# Patient Record
Sex: MALE | Race: WHITE | Employment: FULL TIME | URBAN - METROPOLITAN AREA
[De-identification: names, ages, dates, MRNs, and addresses within clinical notes are randomized per-mention and may not be internally consistent; named-entity substitution may affect disease eponyms.]

---

## 2019-09-18 ENCOUNTER — HOSPITAL ENCOUNTER (EMERGENCY)
Facility: CLINIC | Age: 59
Discharge: ANOTHER HOSPITAL | End: 2019-09-18
Attending: EMERGENCY MEDICINE | Admitting: EMERGENCY MEDICINE
Payer: COMMERCIAL

## 2019-09-18 ENCOUNTER — APPOINTMENT (OUTPATIENT)
Dept: CT IMAGING | Facility: CLINIC | Age: 59
End: 2019-09-18
Attending: EMERGENCY MEDICINE
Payer: COMMERCIAL

## 2019-09-18 VITALS
DIASTOLIC BLOOD PRESSURE: 78 MMHG | SYSTOLIC BLOOD PRESSURE: 126 MMHG | TEMPERATURE: 98.2 F | HEART RATE: 75 BPM | OXYGEN SATURATION: 97 % | RESPIRATION RATE: 13 BRPM

## 2019-09-18 DIAGNOSIS — K12.2 LUDWIG'S ANGINA: ICD-10-CM

## 2019-09-18 LAB
ANION GAP SERPL CALCULATED.3IONS-SCNC: 6 MMOL/L (ref 3–14)
BASOPHILS # BLD AUTO: 0.1 10E9/L (ref 0–0.2)
BASOPHILS NFR BLD AUTO: 0.4 %
BUN SERPL-MCNC: 24 MG/DL (ref 7–30)
CALCIUM SERPL-MCNC: 9.1 MG/DL (ref 8.5–10.1)
CHLORIDE SERPL-SCNC: 106 MMOL/L (ref 94–109)
CO2 BLDCOV-SCNC: 25 MMOL/L (ref 21–28)
CO2 SERPL-SCNC: 28 MMOL/L (ref 20–32)
CREAT BLD-MCNC: 0.9 MG/DL (ref 0.66–1.25)
CREAT SERPL-MCNC: 0.84 MG/DL (ref 0.66–1.25)
DIFFERENTIAL METHOD BLD: ABNORMAL
EOSINOPHIL # BLD AUTO: 1.1 10E9/L (ref 0–0.7)
EOSINOPHIL NFR BLD AUTO: 4.4 %
ERYTHROCYTE [DISTWIDTH] IN BLOOD BY AUTOMATED COUNT: 12.9 % (ref 10–15)
GFR SERPL CREATININE-BSD FRML MDRD: 87 ML/MIN/{1.73_M2}
GFR SERPL CREATININE-BSD FRML MDRD: >90 ML/MIN/{1.73_M2}
GLUCOSE SERPL-MCNC: 110 MG/DL (ref 70–99)
HCT VFR BLD AUTO: 46.9 % (ref 40–53)
HGB BLD-MCNC: 16.5 G/DL (ref 13.3–17.7)
IMM GRANULOCYTES # BLD: 0.2 10E9/L (ref 0–0.4)
IMM GRANULOCYTES NFR BLD: 0.6 %
LACTATE BLD-SCNC: 1.4 MMOL/L (ref 0.7–2.1)
LYMPHOCYTES # BLD AUTO: 4.7 10E9/L (ref 0.8–5.3)
LYMPHOCYTES NFR BLD AUTO: 19.3 %
MCH RBC QN AUTO: 31.2 PG (ref 26.5–33)
MCHC RBC AUTO-ENTMCNC: 35.2 G/DL (ref 31.5–36.5)
MCV RBC AUTO: 89 FL (ref 78–100)
MONOCYTES # BLD AUTO: 1.9 10E9/L (ref 0–1.3)
MONOCYTES NFR BLD AUTO: 7.9 %
NEUTROPHILS # BLD AUTO: 16.5 10E9/L (ref 1.6–8.3)
NEUTROPHILS NFR BLD AUTO: 67.4 %
NRBC # BLD AUTO: 0 10*3/UL
NRBC BLD AUTO-RTO: 0 /100
PCO2 BLDV: 43 MM HG (ref 40–50)
PH BLDV: 7.38 PH (ref 7.32–7.43)
PLATELET # BLD AUTO: 274 10E9/L (ref 150–450)
PO2 BLDV: 48 MM HG (ref 25–47)
POTASSIUM SERPL-SCNC: 3.3 MMOL/L (ref 3.4–5.3)
RBC # BLD AUTO: 5.29 10E12/L (ref 4.4–5.9)
SAO2 % BLDV FROM PO2: 82 %
SODIUM SERPL-SCNC: 140 MMOL/L (ref 133–144)
WBC # BLD AUTO: 24.5 10E9/L (ref 4–11)

## 2019-09-18 PROCEDURE — 25000128 H RX IP 250 OP 636: Performed by: EMERGENCY MEDICINE

## 2019-09-18 PROCEDURE — 70491 CT SOFT TISSUE NECK W/DYE: CPT

## 2019-09-18 PROCEDURE — 80048 BASIC METABOLIC PNL TOTAL CA: CPT | Performed by: EMERGENCY MEDICINE

## 2019-09-18 PROCEDURE — 87040 BLOOD CULTURE FOR BACTERIA: CPT | Performed by: EMERGENCY MEDICINE

## 2019-09-18 PROCEDURE — 83605 ASSAY OF LACTIC ACID: CPT

## 2019-09-18 PROCEDURE — 99285 EMERGENCY DEPT VISIT HI MDM: CPT | Mod: 25

## 2019-09-18 PROCEDURE — 25000125 ZZHC RX 250: Performed by: EMERGENCY MEDICINE

## 2019-09-18 PROCEDURE — 82565 ASSAY OF CREATININE: CPT | Mod: 91

## 2019-09-18 PROCEDURE — 96375 TX/PRO/DX INJ NEW DRUG ADDON: CPT

## 2019-09-18 PROCEDURE — 82803 BLOOD GASES ANY COMBINATION: CPT

## 2019-09-18 PROCEDURE — 85025 COMPLETE CBC W/AUTO DIFF WBC: CPT | Performed by: EMERGENCY MEDICINE

## 2019-09-18 PROCEDURE — 96365 THER/PROPH/DIAG IV INF INIT: CPT | Mod: 59

## 2019-09-18 RX ORDER — LISINOPRIL 40 MG/1
40 TABLET ORAL DAILY
COMMUNITY

## 2019-09-18 RX ORDER — AMLODIPINE BESYLATE 10 MG/1
10 TABLET ORAL DAILY
COMMUNITY

## 2019-09-18 RX ORDER — ATORVASTATIN CALCIUM 80 MG/1
80 TABLET, FILM COATED ORAL AT BEDTIME
COMMUNITY

## 2019-09-18 RX ORDER — METOPROLOL SUCCINATE 100 MG/1
100 TABLET, EXTENDED RELEASE ORAL 2 TIMES DAILY
COMMUNITY

## 2019-09-18 RX ORDER — AMPICILLIN AND SULBACTAM 2; 1 G/1; G/1
3 INJECTION, POWDER, FOR SOLUTION INTRAMUSCULAR; INTRAVENOUS ONCE
Status: COMPLETED | OUTPATIENT
Start: 2019-09-18 | End: 2019-09-18

## 2019-09-18 RX ORDER — IOPAMIDOL 755 MG/ML
500 INJECTION, SOLUTION INTRAVASCULAR ONCE
Status: COMPLETED | OUTPATIENT
Start: 2019-09-18 | End: 2019-09-18

## 2019-09-18 RX ORDER — METHYLPREDNISOLONE SODIUM SUCCINATE 125 MG/2ML
125 INJECTION, POWDER, LYOPHILIZED, FOR SOLUTION INTRAMUSCULAR; INTRAVENOUS ONCE
Status: COMPLETED | OUTPATIENT
Start: 2019-09-18 | End: 2019-09-18

## 2019-09-18 RX ADMIN — AMPICILLIN SODIUM AND SULBACTAM SODIUM 3 G: 2; 1 INJECTION, POWDER, FOR SOLUTION INTRAMUSCULAR; INTRAVENOUS at 20:26

## 2019-09-18 RX ADMIN — RANITIDINE HYDROCHLORIDE 50 MG: 25 INJECTION INTRAMUSCULAR; INTRAVENOUS at 19:42

## 2019-09-18 RX ADMIN — SODIUM CHLORIDE 65 ML: 9 INJECTION, SOLUTION INTRAVENOUS at 19:57

## 2019-09-18 RX ADMIN — METHYLPREDNISOLONE SODIUM SUCCINATE 125 MG: 125 INJECTION, POWDER, FOR SOLUTION INTRAMUSCULAR; INTRAVENOUS at 19:43

## 2019-09-18 RX ADMIN — IOPAMIDOL 80 ML: 755 INJECTION, SOLUTION INTRAVENOUS at 19:57

## 2019-09-18 ASSESSMENT — ENCOUNTER SYMPTOMS
COUGH: 0
TROUBLE SWALLOWING: 1
FEVER: 0

## 2019-09-19 NOTE — PHARMACY-ADMISSION MEDICATION HISTORY
Admission medication history interview status for this patient is complete. See [facility] admission navigator for allergy information, prior to admission medications and immunization status.     Medication history interview source(s):Patient  Medication history resources (including written lists, pill bottles, clinic record):care-everywhere    Changes made to PTA medication list:  Added: all  Deleted: none  Changed: none    Actions taken by pharmacist (provider contacted, etc):None     Additional medication history information:None    Medication reconciliation/reorder completed by provider prior to medication history? No    For patients on insulin therapy: no (Yes/No)   Lantus/levemir/NPH/Mix 70/30 dose: ___ in AM/PM or twice daily   Sliding scale Novolog Y/N   If Yes, do you have a baseline novolog pre-meal dose: ______units with meals   Patients eat three meals a day: Y/N ---  How many episodes of hypoglycemia (low blood glucose) do you have weekly: ---   How many missed doses do you have a week: ---  How many times do you check your blood glucose per day: ---  Any Barriers to therapy: cost of medications/comfortable with giving injections (if applicable)/ comfortable and confident with current diabetes regimen ---      Prior to Admission medications    Medication Sig Last Dose Taking? Auth Provider   amLODIPine (NORVASC) 10 MG tablet Take 10 mg by mouth daily 9/18/2019 at Unknown time Yes Unknown, Entered By History   atorvastatin (LIPITOR) 80 MG tablet Take 80 mg by mouth At Bedtime 9/17/2019 at Unknown time Yes Unknown, Entered By History   lisinopril (PRINIVIL/ZESTRIL) 40 MG tablet Take 40 mg by mouth daily 9/18/2019 at am Yes Unknown, Entered By History   metFORMIN (GLUCOPHAGE) 1000 MG tablet Take 1,000 mg by mouth 2 times daily (with meals) 9/18/2019 at am Yes Unknown, Entered By History   metoprolol succinate ER (TOPROL-XL) 100 MG 24 hr tablet Take 100 mg by mouth 2 times daily 9/18/2019 at am Yes Unknown,  Entered By History

## 2019-09-19 NOTE — ED PROVIDER NOTES
"  History     Chief Complaint:  Oral Swelling    HPI   Phil Pruitt is a 58 year old male with history of type 2 diabetes on Metformin who presents via EMS with oral swelling with no known source. EMS reports that the patient was at [facility] for left sided tongue and throat swelling reportedly starting around 1800. Patient was given 0.3 mg epinephrine IM and 50 mg Benadryl IM at urgent care, with reportedly some improvement. EMS gave the patient 0.5 mg epinephrine IM around 1937 en route. Here, the patient reports sporadic swelling of his tongue and throat when he was doing his \"bending route\" about around 1800. The patient states that his neck does not normally appear distended as it does here. He also notes mild lower dental pain.  He denies any fever, cough, difficulty breathing, but endorses difficulty swallowing, though this has improved since clinic. He does admit to history of lisinopril.     Allergies:  Bupropion  Hydrochlorothiazide  Varenicline     Medications:    Lisinopril  Metformin  Metoprolol succinate  Amlodipine  81 mg Aspirin  Atorvastatin    Past Medical History:    Essential hypertension  Obesity  Adenomatous polyp of colon  Dyslipidemia  Tobacco use   Uncontrolled type II diabetes, no insulin  Bilateral sensorineural hearing loss  Severe ASHLY on CPAP    Past Surgical History:    History reviewed. No pertinent surgical history.    Family History:    Colon cancer  Diabetes  Heart disease    Social History:  Smoking status: Current every day smoker  Alcohol use: Yes  Presents to the ED with himself via EMS  Marital Status:         Review of Systems   Constitutional: Negative for fever.   HENT: Positive for facial swelling (tongue swelling) and trouble swallowing.    Respiratory: Negative for cough and shortness of breath.    All other systems reviewed and are negative.      Physical Exam     Patient Vitals for the past 24 hrs:   BP Pulse Heart Rate Resp SpO2   09/18/19 " 1944 (!) 161/77 80 -- -- 98 %   09/18/19 1943 -- -- 81 23 97 %       Physical Exam  Nursing note and vitals reviewed.  Constitutional: Well nourished. Resting comfortably.   Eyes: Conjunctiva normal.  Pupils are equal, round, and reactive to light.   ENT: Nose normal. Mucous membranes pink and moist.  Tongue swelling, L. Side>R. Side; submandibular and L. cheek edema. No fluctuance/induration/significant warmth. Poor dentition though no obvious abscess along gumline.    Neck: Normal range of motion.  CVS: Normal rate, regular rhythm.  Normal heart sounds.  No murmur.  Pulmonary: Lungs clear to auscultation bilaterally. No wheezes/rales/rhonchi.  GI: Abdomen soft. Nontender, nondistended. No rigidity or guarding.    MSK: No calf tenderness or swelling.  Neuro: Alert. Follows simple commands.  Skin: Skin is warm and dry. No rash noted.   Psychiatric: Normal affect.       Emergency Department Course   Imaging:  Radiographic findings were communicated with the patient who voiced understanding of the findings.    Soft Tissue Neck CT w Contrast  1.  Significant fat stranding within the left submandibular region with contiguous involvement of the floor of mouth, left oropharynx, parapharyngeal space and left submandibular gland. This is most likely related to dental disease at the left third   mandibular molar. No rim-enhancing fluid collection to suggest abscess.  2.  No significant oropharyngeal airway compromise.  3.  Multiple dental caries and periapical lucencies.  As read by Radiology.    Laboratory:  ISTAT VBG: pH: 7.38, PCO2: 43, PO2: 48 (H), Bicarbonate: 25, O2 Sat: 82, Lactic Acid: 1.4  CBC: WBC 24.5 (H) o/w WNL (HGB 16.5, )  BMP: Glucose 110 (H), Potassium 3.3 (L) o/w WNL (Creatinine 0.84)  Creatinine POCT: Creatinine 0.9, GFR 87    Interventions:  1942: 50 mg Zantac IV  1943: 125 mg Solu-Medrol IV  2026: 3 g Unasyn IV    Emergency Department Course:  The patient arrived in the emergency department via  EMS.    Past medical records, nursing notes, and vitals reviewed.  1938: I performed an exam of the patient and obtained history, as documented above.    IV inserted and blood drawn.    The patient was sent for a soft tissue neck CT while in the emergency department, findings above.    2011: I rechecked the patient. He states that his breathing is improving. Explained findings to patient and his spouse.    2103: Discussed the patient with Dr. Lyons. No available ICU beds at [facility].    2111: I rechecked the patient. Explained findings to patient and his spouse.    2120: I rechecked the patient. Explained findings to patient.    2125: I spoke to [facility] regarding bed placement for the patient. He will be accepted for admission at [facility].    2222: I rechecked the patient. Explained findings to patient and his spouse.    Impression & Plan    Medical Decision Making:  Phil Pruitt is a 58 year old male presenting with oral swelling. He received IM epinephrine and Benadryl prior to arrival given concern for angioedema. There was concern however on arrival for more of a Rick s angina presentation. Patient did undergo a formal CT, which showed significant fat stranding to the submandibular region. There was no identifiable abscess. I discussed the case with our hospitalist, who is deferring admission at this point in time. There were no beds available at [facility] for a maxillofacial consult, so patient has decided to pursue transfer to [facility]. He was accepted to there facility. Patient protecting his airway, no indication for emergent intubation at this time. He was treated with IV Unasyn, given concern for an early Rick s presentation. He does have a noted leukocytosis, though otherwise lab work is reassuring. He was transferred in stable condition.     Diagnosis:    ICD-10-CM    1. Rick's angina K12.2        Disposition: Transfer to [facility].    Danyelle FLORES, am serving as a scribe at 7:38 PM on 9/18/2019 to  document services personally performed by Kylah Unger DO based on my observations and the provider's statements to me.     Danyelle Carranza  9/18/2019   [facility] EMERGENCY DEPARTMENT            Kylah Unger DO  09/20/19 1936

## 2019-09-19 NOTE — ED NOTES
Bed: ED03  Expected date: 9/18/19  Expected time: 7:35 PM  Means of arrival: Ambulance  Comments:  BV1

## 2019-09-19 NOTE — ED TRIAGE NOTES
Pt arrives via EMS from [facility] d/t L sided tongue and throat swelling that reportedly started around 1800. Pt given 0.3 mg Epi IM and benadryl IM by . Pt had some improvement. EMS gave 0.5 mg epi IM around 1937. Denies new meds or new foods. Has been on lisinopril for past year. ABC intact.

## 2019-09-20 ASSESSMENT — ENCOUNTER SYMPTOMS
SHORTNESS OF BREATH: 0
FACIAL SWELLING: 1

## 2019-09-25 LAB
BACTERIA SPEC CULT: NO GROWTH
BACTERIA SPEC CULT: NO GROWTH
Lab: NORMAL
Lab: NORMAL
SPECIMEN SOURCE: NORMAL
SPECIMEN SOURCE: NORMAL

## 2019-10-17 ENCOUNTER — APPOINTMENT (OUTPATIENT)
Dept: CT IMAGING | Facility: CLINIC | Age: 59
End: 2019-10-17
Attending: EMERGENCY MEDICINE
Payer: COMMERCIAL

## 2019-10-17 ENCOUNTER — APPOINTMENT (OUTPATIENT)
Dept: GENERAL RADIOLOGY | Facility: CLINIC | Age: 59
End: 2019-10-17
Attending: EMERGENCY MEDICINE
Payer: COMMERCIAL

## 2019-10-17 ENCOUNTER — HOSPITAL ENCOUNTER (EMERGENCY)
Facility: CLINIC | Age: 59
Discharge: HOME | End: 2019-10-17
Attending: EMERGENCY MEDICINE | Admitting: EMERGENCY MEDICINE
Payer: COMMERCIAL

## 2019-10-17 VITALS
OXYGEN SATURATION: 97 % | BODY MASS INDEX: 31.06 KG/M2 | SYSTOLIC BLOOD PRESSURE: 130 MMHG | HEIGHT: 74 IN | WEIGHT: 242 LBS | HEART RATE: 53 BPM | TEMPERATURE: 97.5 F | DIASTOLIC BLOOD PRESSURE: 86 MMHG | RESPIRATION RATE: 20 BRPM

## 2019-10-17 DIAGNOSIS — M54.9 ACUTE BILATERAL BACK PAIN, UNSPECIFIED BACK LOCATION: ICD-10-CM

## 2019-10-17 DIAGNOSIS — S09.90XA CLOSED HEAD INJURY, INITIAL ENCOUNTER: ICD-10-CM
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
## 2019-10-17 PROCEDURE — 25000132 ZZH RX MED GY IP 250 OP 250 PS 637: Performed by: EMERGENCY MEDICINE

## 2019-10-17 PROCEDURE — 99285 EMERGENCY DEPT VISIT HI MDM: CPT | Mod: 25

## 2019-10-17 PROCEDURE — 71046 X-RAY EXAM CHEST 2 VIEWS: CPT

## 2019-10-17 PROCEDURE — 70450 CT HEAD/BRAIN W/O DYE: CPT

## 2019-10-17 PROCEDURE — 72100 X-RAY EXAM L-S SPINE 2/3 VWS: CPT

## 2019-10-17 RX ORDER — IBUPROFEN 600 MG/1
600 TABLET, FILM COATED ORAL ONCE
Status: COMPLETED | OUTPATIENT
Start: 2019-10-17 | End: 2019-10-17

## 2019-10-17 RX ADMIN — IBUPROFEN 600 MG: 600 TABLET, FILM COATED ORAL at 17:45

## 2019-10-17 ASSESSMENT — ENCOUNTER SYMPTOMS
HEADACHES: 1
MYALGIAS: 1
ARTHRALGIAS: 1
BACK PAIN: 1

## 2019-10-17 ASSESSMENT — MIFFLIN-ST. JEOR: SCORE: 1987.45
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
## 2019-10-17 NOTE — LETTER
October 17, 2019      To Whom It May Concern:      Phil Pruitt was seen in our Emergency Department today, 10/17/19.  I expect his condition to improve over the next 1-3 days.  He may return to work/school when improved.    Sincerely,        Cindy APODACA RN

## 2019-10-17 NOTE — ED AVS SNAPSHOT
[facility] Emergency Department  [address]  [facility] [address]  Phone:  [phone]  Fax:  [phone]                                    Phil Pruitt   MRN: [id number]    Department:  [facility] Emergency Department   Date of Visit:  10/17/2019           After Visit Summary Signature Page    I have received my discharge instructions, and my questions have been answered. I have discussed any challenges I see with this plan with the nurse or doctor.    ..........................................................................................................................................  Patient/Patient Representative Signature      ..........................................................................................................................................  Patient Representative Print Name and Relationship to Patient    ..................................................               ................................................  Date                                   Time    ..........................................................................................................................................  Reviewed by Signature/Title    ...................................................              ..............................................  Date                                               Time          22EPIC Rev 08/18